# Patient Record
Sex: FEMALE | Race: WHITE | NOT HISPANIC OR LATINO | URBAN - METROPOLITAN AREA
[De-identification: names, ages, dates, MRNs, and addresses within clinical notes are randomized per-mention and may not be internally consistent; named-entity substitution may affect disease eponyms.]

---

## 2018-06-01 ENCOUNTER — OFFICE VISIT (OUTPATIENT)
Dept: SURGERY | Facility: CLINIC | Age: 48
End: 2018-06-01
Payer: COMMERCIAL

## 2018-06-01 VITALS
BODY MASS INDEX: 28.71 KG/M2 | HEIGHT: 62 IN | SYSTOLIC BLOOD PRESSURE: 126 MMHG | HEART RATE: 63 BPM | DIASTOLIC BLOOD PRESSURE: 76 MMHG | TEMPERATURE: 98.6 F | WEIGHT: 156 LBS

## 2018-06-01 DIAGNOSIS — L72.3 SEBACEOUS CYST: Primary | ICD-10-CM

## 2018-06-01 PROCEDURE — 99213 OFFICE O/P EST LOW 20 MIN: CPT | Performed by: SURGERY

## 2018-06-01 RX ORDER — DOXYCYCLINE 100 MG/1
100 CAPSULE ORAL
Refills: 0 | COMMUNITY
Start: 2018-05-22

## 2018-06-01 NOTE — LETTER
"June 9, 2018     Alma Ledesma RN    Patient: Corina Galicia   YOB: 1970   Date of Visit: 6/1/2018       Dear Dr. Ledesma:    Thank you for referring Corina Galicia to me for evaluation. Below are my notes for this consultation.    If you have questions, please do not hesitate to call me. I look forward to following your patient along with you.         Sincerely,        Gino Oropeza MD        CC: No Recipients  Gino Oropeza MD  6/9/2018 11:55 AM  Sign at close encounter    Subjective     Patient ID: Corina Galicia is a 47 y.o. female.    HPI   Patient known to me from previous excision of a sebaceous cyst of the right groin crease.  She is developed a new nodule at the lateral edge of her previous incision.  She has seen other surgeons as well as dermatologist in the past.  She also has a small cyst of her right buttock which is recently been drained by a dermatologist.    Review of Systems   All other systems reviewed and are negative.      Objective     Vitals:    06/01/18 1128   BP: 126/76   Pulse: 63   Temp: 37 °C (98.6 °F)   Weight: 70.8 kg (156 lb)   Height: 1.585 m (5' 2.4\")     Body mass index is 28.17 kg/m².    Physical Exam   Constitutional: She appears well-developed and well-nourished.   HENT:   Head: Normocephalic.   Neck: Normal range of motion.   Cardiovascular: Normal rate and regular rhythm.    Pulmonary/Chest: Effort normal and breath sounds normal.   Abdominal: Soft. Bowel sounds are normal.   Musculoskeletal: Normal range of motion.   Neurological: She is alert.   Skin: Skin is warm.   1 cm cyst in the right groin crease, with slight drainage.  Well-healed incision.  1 cm cyst of the right buttock that does not appear to be infected but there is a small opening due to previous procedure.   Psychiatric: She has a normal mood and affect. Her behavior is normal. Judgment and thought content normal.       Assessment/Plan   Problem List Items Addressed This Visit     Sebaceous " cyst - Primary        I had a discussion with the patient and her .  I am concerned that she may have hidradenitis in her right groin.  But there is a discrete nodule and this may be compatible with a new cyst.  At the present time I would like any inflammation to settle down and have asked her to keep the areas clean and dry as possible and return in 1 month's time for reevaluation and possibly scheduling of excision of the right groin cyst in the right buttock cyst.

## 2018-06-06 PROBLEM — L98.8 SEBACEOUS PAPULE: Chronic | Status: ACTIVE | Noted: 2018-06-06

## 2018-06-06 PROBLEM — L72.3 SEBACEOUS CYST: Status: ACTIVE | Noted: 2018-06-06

## 2018-06-09 NOTE — PROGRESS NOTES
"  Subjective     Patient ID: Corina Galicia is a 47 y.o. female.    HPI   Patient known to me from previous excision of a sebaceous cyst of the right groin crease.  She is developed a new nodule at the lateral edge of her previous incision.  She has seen other surgeons as well as dermatologist in the past.  She also has a small cyst of her right buttock which is recently been drained by a dermatologist.    Review of Systems   All other systems reviewed and are negative.      Objective     Vitals:    06/01/18 1128   BP: 126/76   Pulse: 63   Temp: 37 °C (98.6 °F)   Weight: 70.8 kg (156 lb)   Height: 1.585 m (5' 2.4\")     Body mass index is 28.17 kg/m².    Physical Exam   Constitutional: She appears well-developed and well-nourished.   HENT:   Head: Normocephalic.   Neck: Normal range of motion.   Cardiovascular: Normal rate and regular rhythm.    Pulmonary/Chest: Effort normal and breath sounds normal.   Abdominal: Soft. Bowel sounds are normal.   Musculoskeletal: Normal range of motion.   Neurological: She is alert.   Skin: Skin is warm.   1 cm cyst in the right groin crease, with slight drainage.  Well-healed incision.  1 cm cyst of the right buttock that does not appear to be infected but there is a small opening due to previous procedure.   Psychiatric: She has a normal mood and affect. Her behavior is normal. Judgment and thought content normal.       Assessment/Plan   Problem List Items Addressed This Visit     Sebaceous cyst - Primary        I had a discussion with the patient and her .  I am concerned that she may have hidradenitis in her right groin.  But there is a discrete nodule and this may be compatible with a new cyst.  At the present time I would like any inflammation to settle down and have asked her to keep the areas clean and dry as possible and return in 1 month's time for reevaluation and possibly scheduling of excision of the right groin cyst in the right buttock cyst.    "

## 2018-06-29 ENCOUNTER — OFFICE VISIT (OUTPATIENT)
Dept: SURGERY | Facility: CLINIC | Age: 48
End: 2018-06-29
Payer: COMMERCIAL

## 2018-06-29 VITALS
HEIGHT: 63 IN | HEART RATE: 76 BPM | BODY MASS INDEX: 27.64 KG/M2 | TEMPERATURE: 99 F | WEIGHT: 156 LBS | DIASTOLIC BLOOD PRESSURE: 80 MMHG | SYSTOLIC BLOOD PRESSURE: 122 MMHG

## 2018-06-29 DIAGNOSIS — L05.91 PILONIDAL CYST: Primary | ICD-10-CM

## 2018-06-29 PROCEDURE — 10060 I&D ABSCESS SIMPLE/SINGLE: CPT | Performed by: SURGERY

## 2018-06-29 PROCEDURE — 99213 OFFICE O/P EST LOW 20 MIN: CPT | Mod: 25 | Performed by: SURGERY

## 2018-06-29 NOTE — PATIENT INSTRUCTIONS
"Wound care after abscess drainage    1.  Your wound has been packed open with gauze that has been soaked with Betadine (brown).  If there is leakage through the bandages, either reinforce with more bandage or remove the outer bandages and replace them.  2.  In 24 hours remove all of the bandages, including the one packed under the skin.  There will likely be some oozing and bleeding.  This will stop on its own.  3.  It is okay to take a shower and get the wound wet, soap and water is fine.  4.  Twice a day have someone take a Q-tip (does not need to be sterile and gloves are not needed), soak it with hydrogen peroxide, and cleanse the wound deeply.  The Q-tip needs to go deep into the wound to clean out the debris and the old blood inside.  The peroxide will foam up (\"scrubbing bubbles\").  Repeat several times.  5.  After cleansing the wound place a clean dry dressing over the wound until next time, you do not need to repack it.  If there is not too much drainage you may use a Band-Aid.  6.  It is important to aggressively cleanse and keep the wound open.  It will try to close in between cleanings.  7.  You will return in about 1 week to check the wound, it should still be open.  It takes about 2 weeks for it to close, which it will do even with the Q-tip cleaning.    "

## 2018-06-29 NOTE — LETTER
June 29, 2018     Alma Ledesma RN    Patient: Corina Galicia   YOB: 1970   Date of Visit: 6/29/2018       Dear Dr. Ledesma:    Thank you for referring Corina Galicia to me for evaluation. Below are my notes for this consultation.    If you have questions, please do not hesitate to call me. I look forward to following your patient along with you.         Sincerely,        Gino Oropeza MD        CC: No Recipients  Gino Oropeza MD  6/29/2018 11:55 AM  Sign at close encounter  Previously seen with a cyst in the right groin which is at present quiescent.  She has had increasing symptoms with drainage from an abscess in the right pilonidal area.  We will proceed with drainage today.      Incision and drainage abscess, pilonidal    The area of the abscess was prepped in the standard fashion.  It was infiltrated with 1% lidocaine local anesthesia.  Incision and drainage was carried out with a #11 blade.  Purulent material was evacuated.  A culture was taken and sent to microbiology.  Standard postprocedure dressing followed. The patient tolerated the procedure well, and was instructed in wound care.    Follow-up in 1 week

## 2018-06-29 NOTE — PROGRESS NOTES
Previously seen with a cyst in the right groin which is at present quiescent.  She has had increasing symptoms with drainage from an abscess in the right pilonidal area.  We will proceed with drainage today.      Incision and drainage abscess, pilonidal    The area of the abscess was prepped in the standard fashion.  It was infiltrated with 1% lidocaine local anesthesia.  Incision and drainage was carried out with a #11 blade.  Purulent material was evacuated.  A culture was taken and sent to microbiology.  Standard postprocedure dressing followed. The patient tolerated the procedure well, and was instructed in wound care.    Follow-up in 1 week

## 2018-07-06 ENCOUNTER — OFFICE VISIT (OUTPATIENT)
Dept: SURGERY | Facility: CLINIC | Age: 48
End: 2018-07-06
Payer: COMMERCIAL

## 2018-07-06 VITALS
HEART RATE: 96 BPM | WEIGHT: 156 LBS | HEIGHT: 63 IN | SYSTOLIC BLOOD PRESSURE: 126 MMHG | BODY MASS INDEX: 27.64 KG/M2 | OXYGEN SATURATION: 98 % | DIASTOLIC BLOOD PRESSURE: 82 MMHG | TEMPERATURE: 98 F

## 2018-07-06 DIAGNOSIS — L72.3 SEBACEOUS CYST: Primary | ICD-10-CM

## 2018-07-06 DIAGNOSIS — L05.91 PILONIDAL CYST: ICD-10-CM

## 2018-07-06 PROCEDURE — 99024 POSTOP FOLLOW-UP VISIT: CPT | Performed by: SURGERY

## 2018-07-06 NOTE — LETTER
July 16, 2018     Alma Ledesma RN    Patient: Corina Galicia   YOB: 1970   Date of Visit: 7/6/2018       Dear Dr. Ledesma:    Thank you for referring Corina Galicia to me for evaluation. Below are my notes for this consultation.    If you have questions, please do not hesitate to call me. I look forward to following your patient along with you.         Sincerely,        Gino Oropeza MD        CC: No Recipients  Gino Oropeza MD  7/16/2018  2:10 PM  Sign at close encounter  Postop office visit:      The patient is post I&D of infected pilonidal cyst. They are making a nice recovery without complaint. They have resumed normal activities.    On examination their surgical site is healing normally. The incision is clean without evidence of ongoing infection.  The wound remains open.  Her right groin cyst is stable    The patient can resume unrestricted physical activities.  She will return in 1 month to discuss excision of the residual pilonidal cyst and sebaceous cyst of the right groin.    Return here as needed      Gino Oropeza MD    7/16/2018

## 2018-07-16 NOTE — PATIENT INSTRUCTIONS
Continue present wound care.  Follow-up in 1 month to discuss possible excision of sebaceous cyst and pilonidal cyst.

## 2018-07-16 NOTE — PROGRESS NOTES
Postop office visit:      The patient is post I&D of infected pilonidal cyst. They are making a nice recovery without complaint. They have resumed normal activities.    On examination their surgical site is healing normally. The incision is clean without evidence of ongoing infection.  The wound remains open.  Her right groin cyst is stable    The patient can resume unrestricted physical activities.  She will return in 1 month to discuss excision of the residual pilonidal cyst and sebaceous cyst of the right groin.    Return here as needed      Gino Oropeza MD    7/16/2018

## 2018-08-17 ENCOUNTER — OFFICE VISIT (OUTPATIENT)
Dept: SURGERY | Facility: CLINIC | Age: 48
End: 2018-08-17
Payer: COMMERCIAL

## 2018-08-17 VITALS — HEIGHT: 63 IN | WEIGHT: 156 LBS | BODY MASS INDEX: 27.64 KG/M2

## 2018-08-17 DIAGNOSIS — L05.91 PILONIDAL CYST: ICD-10-CM

## 2018-08-17 DIAGNOSIS — L72.3 SEBACEOUS CYST: Primary | ICD-10-CM

## 2018-08-17 PROCEDURE — 99213 OFFICE O/P EST LOW 20 MIN: CPT | Performed by: SURGERY

## 2018-08-17 NOTE — LETTER
"August 23, 2018     Alma Ledesma RN    Patient: Corina Galicia   YOB: 1970   Date of Visit: 8/17/2018       Dear Dr. Ledesma:    Thank you for referring Corina Galicia to me for evaluation. Below are my notes for this consultation.    If you have questions, please do not hesitate to call me. I look forward to following your patient along with you.         Sincerely,        Gino Oropeza MD        CC: No Recipients  Gino Oropeza MD  8/23/2018  2:34 PM  Sign at close encounter    Subjective     Patient ID: Corina Galicia is a 48 y.o. female.    HPI  I have followed this patient for a number of months following excision of a sebaceous cyst from her right groin.  She developed some recurrent drainage in that area and may have another sebaceous cyst which is quiescent at this time.  She also has intermittently had drainage from the pilonidal area and has had drainage procedures in the past.  At the present time this is healed over but there is a palpable lump consistent with a pilonidal cyst that does not appear to be abscessed at this time.  Review of Systems   All other systems reviewed and are negative.      Objective     Vitals:    08/17/18 1015   Weight: 70.8 kg (156 lb)   Height: 1.588 m (5' 2.5\")     Body mass index is 28.08 kg/m².    Physical Exam   Constitutional: She appears well-developed.   HENT:   Head: Normocephalic.   Cardiovascular: Normal rate.    Pulmonary/Chest: Effort normal.   Musculoskeletal: Normal range of motion.   Neurological: She is alert.   Psychiatric: She has a normal mood and affect. Her behavior is normal.     Lump in the pilonidal area which is mildly tender, without erythema ,the right groin is stable.  Assessment/Plan   Problem List Items Addressed This Visit     Sebaceous cyst - Primary    Pilonidal cyst      She has recurrent pilonidal inflammation and at this point I think excision is warranted.  We discussed the procedure and its will schedule this at her " convenience in the future.  She understands that she may have an open wound which require long-term care.  At the present time the right groin wound is quiescent and we will continue to observe this area.

## 2018-08-23 NOTE — PATIENT INSTRUCTIONS
She has recurrent pilonidal inflammation and at this point I think excision is warranted.  We discussed the procedure and its will schedule this at her convenience in the future.  She understands that she may have an open wound which require long-term care.  At the present time the right groin wound is quiescent and we will continue to observe this area.

## 2018-08-23 NOTE — PROGRESS NOTES
"  Subjective     Patient ID: Corina Galicia is a 48 y.o. female.    HPI  I have followed this patient for a number of months following excision of a sebaceous cyst from her right groin.  She developed some recurrent drainage in that area and may have another sebaceous cyst which is quiescent at this time.  She also has intermittently had drainage from the pilonidal area and has had drainage procedures in the past.  At the present time this is healed over but there is a palpable lump consistent with a pilonidal cyst that does not appear to be abscessed at this time.  Review of Systems   All other systems reviewed and are negative.      Objective     Vitals:    08/17/18 1015   Weight: 70.8 kg (156 lb)   Height: 1.588 m (5' 2.5\")     Body mass index is 28.08 kg/m².    Physical Exam   Constitutional: She appears well-developed.   HENT:   Head: Normocephalic.   Cardiovascular: Normal rate.    Pulmonary/Chest: Effort normal.   Musculoskeletal: Normal range of motion.   Neurological: She is alert.   Psychiatric: She has a normal mood and affect. Her behavior is normal.     Lump in the pilonidal area which is mildly tender, without erythema ,the right groin is stable.  Assessment/Plan   Problem List Items Addressed This Visit     Sebaceous cyst - Primary    Pilonidal cyst      She has recurrent pilonidal inflammation and at this point I think excision is warranted.  We discussed the procedure and its will schedule this at her convenience in the future.  She understands that she may have an open wound which require long-term care.  At the present time the right groin wound is quiescent and we will continue to observe this area.      "

## 2018-09-15 LAB
BASOPHILS # BLD AUTO: 0 X10E3/UL (ref 0–0.2)
BASOPHILS NFR BLD AUTO: 0 %
BUN SERPL-MCNC: 7 MG/DL (ref 6–24)
BUN/CREAT SERPL: 12 (ref 9–23)
CALCIUM SERPL-MCNC: 9.1 MG/DL (ref 8.7–10.2)
CHLORIDE SERPL-SCNC: 100 MMOL/L (ref 96–106)
CO2 SERPL-SCNC: 22 MMOL/L (ref 20–29)
CREAT SERPL-MCNC: 0.57 MG/DL (ref 0.57–1)
EOSINOPHIL # BLD AUTO: 0.3 X10E3/UL (ref 0–0.4)
EOSINOPHIL NFR BLD AUTO: 3 %
ERYTHROCYTE [DISTWIDTH] IN BLOOD BY AUTOMATED COUNT: 16.2 % (ref 12.3–15.4)
GLUCOSE SERPL-MCNC: 195 MG/DL (ref 65–99)
HCT VFR BLD AUTO: 37.1 % (ref 34–46.6)
HGB BLD-MCNC: 11.7 G/DL (ref 11.1–15.9)
IMM GRANULOCYTES # BLD: 0 X10E3/UL (ref 0–0.1)
IMM GRANULOCYTES NFR BLD: 0 %
LAB CORP EGFR IF AFRICN AM: 129 ML/MIN/1.73
LAB CORP EGFR IF NONAFRICN AM: 112 ML/MIN/1.73
LYMPHOCYTES # BLD AUTO: 3.2 X10E3/UL (ref 0.7–3.1)
LYMPHOCYTES NFR BLD AUTO: 35 %
MCH RBC QN AUTO: 26.1 PG (ref 26.6–33)
MCHC RBC AUTO-ENTMCNC: 31.5 G/DL (ref 31.5–35.7)
MCV RBC AUTO: 83 FL (ref 79–97)
MONOCYTES # BLD AUTO: 0.7 X10E3/UL (ref 0.1–0.9)
MONOCYTES NFR BLD AUTO: 7 %
NEUTROPHILS # BLD AUTO: 5 X10E3/UL (ref 1.4–7)
NEUTROPHILS NFR BLD AUTO: 55 %
PLATELET # BLD AUTO: 432 X10E3/UL (ref 150–379)
POTASSIUM SERPL-SCNC: 5 MMOL/L (ref 3.5–5.2)
RBC # BLD AUTO: 4.49 X10E6/UL (ref 3.77–5.28)
SODIUM SERPL-SCNC: 137 MMOL/L (ref 134–144)
WBC # BLD AUTO: 9.2 X10E3/UL (ref 3.4–10.8)

## 2018-09-20 ENCOUNTER — ANESTHESIA (OUTPATIENT)
Dept: OPERATING ROOM | Facility: HOSPITAL | Age: 48
Setting detail: HOSPITAL OUTPATIENT SURGERY
End: 2018-09-20
Payer: COMMERCIAL

## 2018-09-20 ENCOUNTER — HOSPITAL ENCOUNTER (OUTPATIENT)
Facility: HOSPITAL | Age: 48
Discharge: HOME | End: 2018-09-20
Attending: SURGERY | Admitting: SURGERY
Payer: COMMERCIAL

## 2018-09-20 VITALS
OXYGEN SATURATION: 96 % | TEMPERATURE: 98.2 F | SYSTOLIC BLOOD PRESSURE: 107 MMHG | DIASTOLIC BLOOD PRESSURE: 63 MMHG | RESPIRATION RATE: 18 BRPM | HEART RATE: 97 BPM

## 2018-09-20 DIAGNOSIS — L72.3 SEBACEOUS CYST: ICD-10-CM

## 2018-09-20 LAB
B-HCG UR QL: NEGATIVE
POCT TEST: NORMAL

## 2018-09-20 PROCEDURE — 63600000 HC DRUGS/DETAIL CODE: Performed by: ANESTHESIOLOGY

## 2018-09-20 PROCEDURE — 25000000 HC PHARMACY GENERAL: Performed by: NURSE ANESTHETIST, CERTIFIED REGISTERED

## 2018-09-20 PROCEDURE — 71000001 HC PACU PHASE 1 INITIAL 30MIN: Performed by: SURGERY

## 2018-09-20 PROCEDURE — 36000003 HC OR LEVEL 3 INITIAL 30MIN: Performed by: SURGERY

## 2018-09-20 PROCEDURE — 63700000 HC SELF-ADMINISTRABLE DRUG: Performed by: STUDENT IN AN ORGANIZED HEALTH CARE EDUCATION/TRAINING PROGRAM

## 2018-09-20 PROCEDURE — 37000001 HC ANESTHESIA GENERAL: Performed by: SURGERY

## 2018-09-20 PROCEDURE — 36000013 HC OR LEVEL 3 EA ADDL MIN: Performed by: SURGERY

## 2018-09-20 PROCEDURE — 71000011 HC PACU PHASE 1 EA ADDL MIN: Performed by: SURGERY

## 2018-09-20 PROCEDURE — 63600000 HC DRUGS/DETAIL CODE: Performed by: SURGERY

## 2018-09-20 PROCEDURE — 63600000 HC DRUGS/DETAIL CODE: Performed by: NURSE ANESTHETIST, CERTIFIED REGISTERED

## 2018-09-20 PROCEDURE — 25000000 HC PHARMACY GENERAL: Performed by: SURGERY

## 2018-09-20 PROCEDURE — 88304 TISSUE EXAM BY PATHOLOGIST: CPT | Performed by: SURGERY

## 2018-09-20 PROCEDURE — 0JB90ZZ EXCISION OF BUTTOCK SUBCUTANEOUS TISSUE AND FASCIA, OPEN APPROACH: ICD-10-PCS | Performed by: SURGERY

## 2018-09-20 PROCEDURE — 11771 EXC PILONIDAL CYST XTNSV: CPT | Performed by: SURGERY

## 2018-09-20 PROCEDURE — 25800000 HC PHARMACY IV SOLUTIONS: Performed by: NURSE ANESTHETIST, CERTIFIED REGISTERED

## 2018-09-20 PROCEDURE — 200200 PR NO CHARGE: Performed by: SURGERY

## 2018-09-20 RX ORDER — DEXAMETHASONE SODIUM PHOSPHATE 4 MG/ML
INJECTION, SOLUTION INTRA-ARTICULAR; INTRALESIONAL; INTRAMUSCULAR; INTRAVENOUS; SOFT TISSUE AS NEEDED
Status: DISCONTINUED | OUTPATIENT
Start: 2018-09-20 | End: 2018-09-20 | Stop reason: SURG

## 2018-09-20 RX ORDER — OXYCODONE HYDROCHLORIDE 5 MG/1
5 TABLET ORAL EVERY 4 HOURS PRN
Qty: 14 TABLET | Refills: 0 | Status: SHIPPED | OUTPATIENT
Start: 2018-09-20 | End: 2018-09-25

## 2018-09-20 RX ORDER — MIDAZOLAM HYDROCHLORIDE 2 MG/2ML
INJECTION, SOLUTION INTRAMUSCULAR; INTRAVENOUS AS NEEDED
Status: DISCONTINUED | OUTPATIENT
Start: 2018-09-20 | End: 2018-09-20 | Stop reason: SURG

## 2018-09-20 RX ORDER — FENTANYL CITRATE 50 UG/ML
INJECTION, SOLUTION INTRAMUSCULAR; INTRAVENOUS AS NEEDED
Status: DISCONTINUED | OUTPATIENT
Start: 2018-09-20 | End: 2018-09-20 | Stop reason: SURG

## 2018-09-20 RX ORDER — BUPIVACAINE HYDROCHLORIDE 2.5 MG/ML
INJECTION, SOLUTION EPIDURAL; INFILTRATION; INTRACAUDAL AS NEEDED
Status: DISCONTINUED | OUTPATIENT
Start: 2018-09-20 | End: 2018-09-20 | Stop reason: HOSPADM

## 2018-09-20 RX ORDER — ONDANSETRON HYDROCHLORIDE 2 MG/ML
4 INJECTION, SOLUTION INTRAVENOUS EVERY 8 HOURS PRN
Status: DISCONTINUED | OUTPATIENT
Start: 2018-09-20 | End: 2018-09-20 | Stop reason: HOSPADM

## 2018-09-20 RX ORDER — FENTANYL CITRATE 50 UG/ML
50 INJECTION, SOLUTION INTRAMUSCULAR; INTRAVENOUS
Status: DISCONTINUED | OUTPATIENT
Start: 2018-09-20 | End: 2018-09-20 | Stop reason: HOSPADM

## 2018-09-20 RX ORDER — ONDANSETRON HYDROCHLORIDE 2 MG/ML
4 INJECTION, SOLUTION INTRAVENOUS
Status: DISCONTINUED | OUTPATIENT
Start: 2018-09-20 | End: 2018-09-20 | Stop reason: HOSPADM

## 2018-09-20 RX ORDER — CEFAZOLIN SODIUM 2 G/50ML
SOLUTION INTRAVENOUS
Status: COMPLETED
Start: 2018-09-20 | End: 2018-09-20

## 2018-09-20 RX ORDER — LABETALOL HYDROCHLORIDE 5 MG/ML
5 INJECTION, SOLUTION INTRAVENOUS AS NEEDED
Status: DISCONTINUED | OUTPATIENT
Start: 2018-09-20 | End: 2018-09-20 | Stop reason: HOSPADM

## 2018-09-20 RX ORDER — ROCURONIUM BROMIDE 10 MG/ML
INJECTION, SOLUTION INTRAVENOUS AS NEEDED
Status: DISCONTINUED | OUTPATIENT
Start: 2018-09-20 | End: 2018-09-20 | Stop reason: SURG

## 2018-09-20 RX ORDER — IBUPROFEN 400 MG/1
400 TABLET ORAL EVERY 6 HOURS PRN
Status: DISCONTINUED | OUTPATIENT
Start: 2018-09-20 | End: 2018-09-20 | Stop reason: HOSPADM

## 2018-09-20 RX ORDER — KETOROLAC TROMETHAMINE 30 MG/ML
INJECTION, SOLUTION INTRAMUSCULAR; INTRAVENOUS AS NEEDED
Status: DISCONTINUED | OUTPATIENT
Start: 2018-09-20 | End: 2018-09-20 | Stop reason: SURG

## 2018-09-20 RX ORDER — PROPOFOL 10 MG/ML
INJECTION, EMULSION INTRAVENOUS AS NEEDED
Status: DISCONTINUED | OUTPATIENT
Start: 2018-09-20 | End: 2018-09-20 | Stop reason: SURG

## 2018-09-20 RX ORDER — ACETAMINOPHEN 325 MG/1
650 TABLET ORAL EVERY 4 HOURS PRN
Status: DISCONTINUED | OUTPATIENT
Start: 2018-09-20 | End: 2018-09-20 | Stop reason: HOSPADM

## 2018-09-20 RX ORDER — LIDOCAINE HYDROCHLORIDE 10 MG/ML
INJECTION, SOLUTION INFILTRATION; PERINEURAL AS NEEDED
Status: DISCONTINUED | OUTPATIENT
Start: 2018-09-20 | End: 2018-09-20 | Stop reason: SURG

## 2018-09-20 RX ORDER — LIDOCAINE HYDROCHLORIDE 10 MG/ML
INJECTION, SOLUTION INFILTRATION; PERINEURAL AS NEEDED
Status: DISCONTINUED | OUTPATIENT
Start: 2018-09-20 | End: 2018-09-20 | Stop reason: HOSPADM

## 2018-09-20 RX ORDER — OXYCODONE HYDROCHLORIDE 5 MG/1
5 TABLET ORAL EVERY 4 HOURS PRN
Status: DISCONTINUED | OUTPATIENT
Start: 2018-09-20 | End: 2018-09-20 | Stop reason: HOSPADM

## 2018-09-20 RX ORDER — IBUPROFEN 200 MG
16-32 TABLET ORAL AS NEEDED
Status: DISCONTINUED | OUTPATIENT
Start: 2018-09-20 | End: 2018-09-20 | Stop reason: HOSPADM

## 2018-09-20 RX ORDER — DEXTROSE 40 %
15-30 GEL (GRAM) ORAL AS NEEDED
Status: DISCONTINUED | OUTPATIENT
Start: 2018-09-20 | End: 2018-09-20 | Stop reason: HOSPADM

## 2018-09-20 RX ORDER — PHENYLEPHRINE HYDROCHLORIDE 10 MG/ML
INJECTION INTRAVENOUS AS NEEDED
Status: DISCONTINUED | OUTPATIENT
Start: 2018-09-20 | End: 2018-09-20 | Stop reason: SURG

## 2018-09-20 RX ORDER — ONDANSETRON HYDROCHLORIDE 2 MG/ML
INJECTION, SOLUTION INTRAVENOUS AS NEEDED
Status: DISCONTINUED | OUTPATIENT
Start: 2018-09-20 | End: 2018-09-20 | Stop reason: SURG

## 2018-09-20 RX ORDER — DEXTROSE 50 % IN WATER (D50W) INTRAVENOUS SYRINGE
25 AS NEEDED
Status: DISCONTINUED | OUTPATIENT
Start: 2018-09-20 | End: 2018-09-20 | Stop reason: HOSPADM

## 2018-09-20 RX ORDER — SODIUM CHLORIDE 9 MG/ML
INJECTION, SOLUTION INTRAVENOUS CONTINUOUS PRN
Status: DISCONTINUED | OUTPATIENT
Start: 2018-09-20 | End: 2018-09-20 | Stop reason: SURG

## 2018-09-20 RX ORDER — HYDROMORPHONE HYDROCHLORIDE 1 MG/ML
0.5 INJECTION, SOLUTION INTRAMUSCULAR; INTRAVENOUS; SUBCUTANEOUS
Status: DISCONTINUED | OUTPATIENT
Start: 2018-09-20 | End: 2018-09-20 | Stop reason: HOSPADM

## 2018-09-20 RX ORDER — CEFAZOLIN SODIUM 2 G/50ML
2 SOLUTION INTRAVENOUS
Status: COMPLETED | OUTPATIENT
Start: 2018-09-20 | End: 2018-09-20

## 2018-09-20 RX ADMIN — PHENYLEPHRINE HYDROCHLORIDE 50 MCG: 10 INJECTION INTRAVENOUS at 11:27

## 2018-09-20 RX ADMIN — ROCURONIUM BROMIDE 40 MG: 10 INJECTION, SOLUTION INTRAVENOUS at 11:11

## 2018-09-20 RX ADMIN — DEXAMETHASONE SODIUM PHOSPHATE 8 MG: 4 INJECTION, SOLUTION INTRAMUSCULAR; INTRAVENOUS at 11:33

## 2018-09-20 RX ADMIN — MIDAZOLAM HYDROCHLORIDE 2 MG: 1 INJECTION, SOLUTION INTRAMUSCULAR; INTRAVENOUS at 11:01

## 2018-09-20 RX ADMIN — LIDOCAINE HYDROCHLORIDE 5 ML: 10 INJECTION, SOLUTION INFILTRATION; PERINEURAL at 11:11

## 2018-09-20 RX ADMIN — FENTANYL CITRATE 100 MCG: 50 INJECTION INTRAMUSCULAR; INTRAVENOUS at 11:11

## 2018-09-20 RX ADMIN — PROPOFOL 200 MG: 10 INJECTION, EMULSION INTRAVENOUS at 11:11

## 2018-09-20 RX ADMIN — ONDANSETRON 4 MG: 2 INJECTION INTRAMUSCULAR; INTRAVENOUS at 11:33

## 2018-09-20 RX ADMIN — PHENYLEPHRINE HYDROCHLORIDE 50 MCG: 10 INJECTION INTRAVENOUS at 11:31

## 2018-09-20 RX ADMIN — PHENYLEPHRINE HYDROCHLORIDE 50 MCG: 10 INJECTION INTRAVENOUS at 11:20

## 2018-09-20 RX ADMIN — FENTANYL CITRATE 50 MCG: 50 INJECTION INTRAMUSCULAR; INTRAVENOUS at 13:17

## 2018-09-20 RX ADMIN — CEFAZOLIN SODIUM 2 G: 2 SOLUTION INTRAVENOUS at 11:05

## 2018-09-20 RX ADMIN — SUGAMMADEX 300 MG: 100 INJECTION, SOLUTION INTRAVENOUS at 11:44

## 2018-09-20 RX ADMIN — OXYCODONE HYDROCHLORIDE 5 MG: 5 TABLET ORAL at 14:28

## 2018-09-20 RX ADMIN — SODIUM CHLORIDE: 900 INJECTION, SOLUTION INTRAVENOUS at 11:04

## 2018-09-20 RX ADMIN — ACETAMINOPHEN 650 MG: 325 TABLET, FILM COATED ORAL at 14:28

## 2018-09-20 RX ADMIN — KETOROLAC TROMETHAMINE 30 MG: 30 INJECTION, SOLUTION INTRAMUSCULAR at 11:51

## 2018-09-20 NOTE — H&P
General Surgery History and Physical           Medical History:   Past Medical History:   Diagnosis Date   • Anemia     heavy period        Surgical History:   Past Surgical History:   Procedure Laterality Date   •  SECTION      x1    • CYST REMOVAL Right 2017    Sebaceous cyst of the R upper inner thigh   • CYST REMOVAL  2018       Social History:   Social History     Social History Narrative   • No narrative on file       Family History: History reviewed. No pertinent family history.    Allergies: Amoxicillin and Jovany perox-hc, cleanser no.14    Home Medications:  •  doxycycline, Take 100 mg by mouth once daily. with food    Current Medications:  •  ceFAZolin in dextrose (iso-os), , ,   •  ceFAZolin, 2 g, intravenous, 60 min Pre-Op    Review of Systems  Pertinent items are noted in HPI.    Objective     Vital Signs for the last 24 hours:  Temp:  [36.4 °C (97.6 °F)] 36.4 °C (97.6 °F)  Heart Rate:  [81] 81  Resp:  [17] 17  BP: (120)/(72) 120/72    Physicial Exam          Labs      Imaging      Assessment/Plan     Code Status: Full Code  · Gino Oropeza MD   General Surgery  · Sebaceous cyst +1 more   Dx  · Post-op   Reason for Visit    Progress Notes   Expand All Collapse All         Subjective         Patient ID: Corina Galicia is a 48 y.o. female.     HPI  I have followed this patient for a number of months following excision of a sebaceous cyst from her right groin.  She developed some recurrent drainage in that area and may have another sebaceous cyst which is quiescent at this time.  She also has intermittently had drainage from the pilonidal area and has had drainage procedures in the past.  At the present time this is healed over but there is a palpable lump consistent with a pilonidal cyst that does not appear to be abscessed at this time.  Review of Systems   All other systems reviewed and are negative.           Objective         Vitals       Vitals:     18 1015   Weight: 70.8  "kg (156 lb)   Height: 1.588 m (5' 2.5\")         Body mass index is 28.08 kg/m².     Physical Exam   Constitutional: She appears well-developed.   HENT:   Head: Normocephalic.   Cardiovascular: Normal rate.    Pulmonary/Chest: Effort normal.   Musculoskeletal: Normal range of motion.   Neurological: She is alert.   Psychiatric: She has a normal mood and affect. Her behavior is normal.      Lump in the pilonidal area which is mildly tender, without erythema ,the right groin is stable.     Assessment/Plan          Problem List Items Addressed This Visit      Sebaceous cyst - Primary     Pilonidal cyst       She has recurrent pilonidal inflammation and at this point I think excision is warranted.  We discussed the procedure and its will schedule this at her convenience in the future.  She understands that she may have an open wound which require long-term care.  At the present time the right groin wound is quiescent and we will continue to observe this area.               Instructions   She has recurrent pilonidal inflammation and at this point I think excision is warranted.  We discussed the procedure and its will schedule this at her convenience in the future.  She understands that she may have an open wound which require long-term care.  At the present time the right groin wound is quiescent and we will continue to observe this area.        · After Visit Summary (Printed 8/17/2018)   Additional Documentation   Vitals:   · Ht 1.588 m (5' 2.5\")   · Wt 70.8 kg (156 lb)   · BMI 28.08 kg/m²   · BSA 1.77 m²    Flowsheets:  · [facility] Custom Formula Data        "

## 2018-09-20 NOTE — OP NOTE
Date of surgery: 9/20/2018    Preop diagnosis: Infected pilonidal cyst  Postop diagnosis: Same  Procedure: Excision of pilonidal cyst  Surgeon: Johnna  Assistant: PGY 2  Anesthesia: General ET and local  Specimens: Pilonidal cyst  EBL: Minimal  Complications: None    Indications: 48-year-old female-year-old patient who presented with a long-standing soft tissue lump of the pilonidal area with intermittent drainage.  She states that has not recently had any drainage but continues to be sore.  After discussion with the patient they elected to proceed with excision.    Procedure: The patient was brought into the operating room and placed in a supine position and they were given general anesthesia and intubated.  She was then placed prone on the operating table.  Local consisting of 1% lidocaine mixed half-and-half with quarter percent Marcaine was also used throughout the procedure.  The skin overlying the cyst was clipped prepped and draped in the usual sterile fashion.  Unfortunately there is purulent drainage from the cyst as well as a midline pit close by we therefore decided to excise the cyst back to normal tissue but not attempt any type of closure.  An elliptical incision was made and carried down through the skin and subcutaneous tissue with the scalpel.  The incision incorporated the draining area which was to the right of the midline superiorly as well as midline pits more inferiorly.  Bleeding was controlled with coagulation Bovie.  Bovie was then used to excise the cyst in its entirety to normal subcutaneous tissue circumferentially, the cyst was then pulled into the wound and the posterior dissection completed and the specimen removed.  The wound was irrigated and local was instilled and bleeding was controlled with the Bovie.  The wound was then packed open with Betadine soaked gauze.  Sterile dressings were placed.  Sponge needle and estimate counts were correct ×2.  The patient tolerated the procedure  well and was taken to the recovery room in stable condition.

## 2018-09-20 NOTE — ANESTHESIA PREPROCEDURE EVALUATION
Anesthesia ROS/MED HX    Anesthesia History - neg  Pulmonary - neg  Neuro/Psych - neg  Cardiovascular- neg  GI/Hepatic- neg  Musculoskeletal- neg  Endo/Other- neg      Past Surgical History:   Procedure Laterality Date   •  SECTION      x1    • CYST REMOVAL Right 2017    Sebaceous cyst of the R upper inner thigh   • CYST REMOVAL  2018     CBC Results       18                          1337           WBC 9.2           RBC 4.49           HGB 11.7           HCT 37.1           MCV 83           MCH 26.1 (L)           MCHC 31.5            (H)                           Physical Exam    Airway   Mallampati: III   TM distance: >3 FB   Neck ROM: full  Cardiovascular - normal   Rhythm: regular   Rate: normal  Pulmonary - normal   clear to auscultation        Anesthesia Plan    Plan: general    Technique: general endotracheal   ASA 1  Anesthetic plan and risks discussed with: patient  Induction:    intravenous

## 2018-09-20 NOTE — DISCHARGE INSTRUCTIONS
RECOVERY AFTER YOUR PILONIDAL EXCISION    Pilonidal excisions are painful operations.  The pain is worst for the first 1 week after surgery, and usually disappears completely by 2 to 3 weeks after surgery.  After your operation, do not wait for the pain to be severe before taking your medications.    AFTER DISCHARGE - see me in two weeks !!!    Pain Control  · You will be given a prescription for Ibuprofen.  This will provide the mainstay of pain control after your operation.    The Wound  · Most pilonidal wounds are left open.  Some are closed with sutures.  Sometimes the open wounds are stabilized with sutures.  Some bleeding or discharge from the wound is normal for the first week or two after the operation.  If you have profuse, continuous bleeding, call your doctor immediately.  · Keep the wound very clean.  Take a sitz bath or tub bath (or showers with a hand-held sprayer) 3 to 4 times per day and then cover the wound with dry gauze.  Baths should last 10-15 minutes and should just be in warm (not hot) water.  Change the gauze if the dressings become soaked with discharge or blood.  · If sutures are used to stabilize your wound, THEY WILL UNRAVEL AFTER A WEEK OR TWO.  You may find “strings” hanging from your wound and it is perfectly safe to cut these off with ordinary scissors.   · You will be seeing your surgeon every two weeks for a wound check.    Activity  · Upon discharge, there are generally no restrictions on walking, climbing stairs or riding in a car.  There is very little you can do to damage the wound, but more activity will usually cause more pain or bleeding.  The best idea is to take it easy for about a week.    Causes for Concern  If any of the following occur, please call our office and we will arrange for a doctor to call you back  · Increasing pain, swelling, redness, or drainage from the incision  · Fever greater than 101 degrees / chills  · Severe bleeding  · Nausea or vomiting    Call   Johnna’s office [phone] to schedule an appointment for Friday. If problems arise after discharge, including but not limited to fever, vomiting or increasing redness   and/or discharge from the incision, call Dr. Oropeza’s office immediately.

## 2018-09-20 NOTE — ANESTHESIA PROCEDURE NOTES
Airway  Urgency: elective    Start Time: 9/20/2018 11:13 AM  Airway not difficult    General Information and Staff    Patient location during procedure: OR  Anesthesiologist: GUSTAVO GALAVIZ  Resident/CRNA: JUANA FRENCH  Performed: resident/CRNA     Indications and Patient Condition  Indications for airway management: anesthesia  Sedation level: deep  Preoxygenated: yes  Patient position: sniffing  Mask difficulty assessment: 1 - vent by mask    Final Airway Details  Final airway type: endotracheal airway      Successful airway: ETT  Cuffed: yes   Successful intubation technique: direct laryngoscopy  Facilitating devices/methods: intubating stylet  Endotracheal tube insertion site: oral  Blade: Antonella  Blade size: #3  ETT size: 7.0 mm  Cormack-Lehane Classification: grade I - full view of glottis  Placement verified by: chest auscultation and capnometry   Measured from: lips  ETT to lips (cm): 22  Number of attempts at approach: 1  Number of other approaches attempted: 0  Atraumatic airway insertion

## 2018-09-21 ENCOUNTER — TELEPHONE (OUTPATIENT)
Dept: SURGERY | Facility: CLINIC | Age: 48
End: 2018-09-21

## 2018-09-21 LAB
CASE RPRT: NORMAL
CLINICAL INFO: NORMAL
PATH REPORT.FINAL DX SPEC: NORMAL
PATH REPORT.GROSS SPEC: NORMAL

## 2018-09-21 NOTE — TELEPHONE ENCOUNTER
Called pt to inform them that [facility] will contact them to set up an appt.  ([facility] called 9/21/18 @2:00pm)

## 2018-09-24 ENCOUNTER — TELEPHONE (OUTPATIENT)
Dept: SURGERY | Facility: CLINIC | Age: 48
End: 2018-09-24

## 2018-09-27 ENCOUNTER — OFFICE VISIT (OUTPATIENT)
Dept: SURGERY | Facility: CLINIC | Age: 48
End: 2018-09-27
Payer: COMMERCIAL

## 2018-09-27 VITALS
HEIGHT: 62 IN | SYSTOLIC BLOOD PRESSURE: 108 MMHG | HEART RATE: 76 BPM | DIASTOLIC BLOOD PRESSURE: 76 MMHG | WEIGHT: 160 LBS | TEMPERATURE: 98.6 F | BODY MASS INDEX: 29.44 KG/M2

## 2018-09-27 DIAGNOSIS — L05.91 PILONIDAL CYST: Primary | ICD-10-CM

## 2018-09-27 PROCEDURE — 99024 POSTOP FOLLOW-UP VISIT: CPT | Performed by: SURGERY

## 2018-09-27 NOTE — LETTER
2018     Alma Ledesma RN    Patient: Corina Galicia   YOB: 1970   Date of Visit: 2018       Dear Dr. Ledesma:    Thank you for referring Corina Galicia to me for evaluation. Below are my notes for this consultation.    If you have questions, please do not hesitate to call me. I look forward to following your patient along with you.         Sincerely,        David Denis MD        CC: No Recipients  David Denis MD  2018  5:09 PM  Sign at close encounter      Chief Complaint:   Chief Complaint   Patient presents with   • Post-op     Sebaceous cyst   .    HPI     Patient is a 48 y.o. female who presents with the following:  Pt is one week out from excision of pilonidal cyst.   She has home nursing care - packing happens once per day - there is a fair amount of pain and drainage -     Medical History:   Past Medical History:   Diagnosis Date   • Anemia     heavy period        Surgical History:   Past Surgical History:   Procedure Laterality Date   •  SECTION      x1    • CYST REMOVAL Right 2017    Sebaceous cyst of the R upper inner thigh   • CYST REMOVAL  2018       Social History:   Social History     Social History   • Marital status:      Spouse name: N/A   • Number of children: N/A   • Years of education: N/A     Occupational History   • Not on file.     Social History Main Topics   • Smoking status: Never Smoker   • Smokeless tobacco: Never Used   • Alcohol use No   • Drug use: No   • Sexual activity: Not on file     Other Topics Concern   • Not on file     Social History Narrative   • No narrative on file       Family History:   History reviewed. No pertinent family history.    Allergies:   Amoxicillin and Jovany perox-hc, cleanser no.14    Current Medications:      Current Outpatient Prescriptions:   •  doxycycline (VIBRAMYCIN) 100 mg capsule, Take 100 mg by mouth once daily. with food, Disp: , Rfl: 0    Review of Systems  All 14  systems reviewed and the findings are not pertinent to the current problem.    Objective     Vital Signs  Vitals:    09/27/18 1654   BP: 108/76   Pulse: 76   Temp: 37 °C (98.6 °F)     Body mass index is 29.26 kg/m².      Physicial Exam    Postop anorectal exam:  The patients wounds are healing normally. No infection or complication is present. Specifics: large healthy 2x3 cm deep wound with almost 100% granulation tissue.         Problem List Items Addressed This Visit     Pilonidal cyst - Primary     She is one week out from excision of a pilonidal cyst.  Her wound is healing well and she has home care help.  I changed the dressing and she will see Dr. Oropeza in 2 weeks.                     David Denis MD 9/27/2018 5:09 PM

## 2018-09-27 NOTE — ASSESSMENT & PLAN NOTE
She is one week out from excision of a pilonidal cyst.  Her wound is healing well and she has home care help.  I changed the dressing and she will see Dr. Oropeza in 2 weeks.

## 2018-10-12 ENCOUNTER — OFFICE VISIT (OUTPATIENT)
Dept: SURGERY | Facility: CLINIC | Age: 48
End: 2018-10-12
Payer: COMMERCIAL

## 2018-10-12 VITALS
DIASTOLIC BLOOD PRESSURE: 79 MMHG | BODY MASS INDEX: 29.44 KG/M2 | HEIGHT: 62 IN | WEIGHT: 160 LBS | SYSTOLIC BLOOD PRESSURE: 120 MMHG

## 2018-10-12 DIAGNOSIS — L05.91 PILONIDAL CYST: Primary | ICD-10-CM

## 2018-10-12 PROCEDURE — 99024 POSTOP FOLLOW-UP VISIT: CPT | Performed by: SURGERY

## 2018-10-12 NOTE — LETTER
October 18, 2018     Alma Ledesma RN    Patient: Corina Galicia   YOB: 1970   Date of Visit: 10/12/2018       Dear Dr. Ledesma:    Thank you for referring Corina Galicia to me for evaluation. Below are my notes for this consultation.    If you have questions, please do not hesitate to call me. I look forward to following your patient along with you.         Sincerely,        Gino Oropeza MD        CC: No Recipients

## 2018-10-15 ENCOUNTER — TELEPHONE (OUTPATIENT)
Dept: SURGERY | Facility: CLINIC | Age: 48
End: 2018-10-15

## 2018-10-18 NOTE — PATIENT INSTRUCTIONS
The patient is post pilonidal cystectomy.  Final pathology was consistent with a pilonidal cyst.  They are making a nice recovery.  They can resume unrestricted physical activities as they feel up to it.  If they are not symptom-free in 6-8 weeks they will get back to me or return for reevaluation.  Otherwise return as needed.

## 2018-10-18 NOTE — PROGRESS NOTES
Postop office visit:      The patient is post pilonidal cystectomy with an open wound. They are making a nice recovery without complaint. They have resumed normal activities.    On examination their surgical site is healing normally. The incision is clean without evidence of infection.  Remains open but is granulating nicely.    The patient can resume unrestricted physical activities.    Return here as needed.  We will continue with visiting nurses for wound care until fully healed.      Gino Oropeza MD    10/18/2018

## 2018-10-30 ENCOUNTER — TELEPHONE (OUTPATIENT)
Dept: SURGERY | Facility: CLINIC | Age: 48
End: 2018-10-30

## 2018-10-30 ENCOUNTER — DOCUMENTATION (OUTPATIENT)
Dept: SURGERY | Facility: CLINIC | Age: 48
End: 2018-10-30

## 2018-10-30 NOTE — PROGRESS NOTES
Spoke with [facility] nurse, as of today she has discharged pt from home health services. States pt is doing well and 99% healed.

## 2018-12-07 ENCOUNTER — OFFICE VISIT (OUTPATIENT)
Dept: SURGERY | Facility: CLINIC | Age: 48
End: 2018-12-07
Payer: COMMERCIAL

## 2018-12-07 VITALS
SYSTOLIC BLOOD PRESSURE: 140 MMHG | HEIGHT: 62 IN | TEMPERATURE: 98.3 F | DIASTOLIC BLOOD PRESSURE: 90 MMHG | BODY MASS INDEX: 28.52 KG/M2 | WEIGHT: 155 LBS

## 2018-12-07 DIAGNOSIS — L05.91 PILONIDAL CYST: ICD-10-CM

## 2018-12-07 DIAGNOSIS — L72.3 SEBACEOUS CYST: Primary | ICD-10-CM

## 2018-12-07 PROCEDURE — 99024 POSTOP FOLLOW-UP VISIT: CPT | Performed by: SURGERY

## 2018-12-07 NOTE — LETTER
December 12, 2018     Alma Ledesma RN    Patient: Corina Galicia   YOB: 1970   Date of Visit: 12/7/2018       Dear Dr. Ledesma:    Thank you for referring Corina Galicia to me for evaluation. Below are my notes for this consultation.    If you have questions, please do not hesitate to call me. I look forward to following your patient along with you.         Sincerely,        Gino Oropeza MD        CC: No Recipients  Gino Oropeza MD  12/12/2018 11:44 AM  Sign at close encounter  Postop office visit:      The patient is post excision of pilonidal cyst. They are making a nice recovery without complaint, except for occasional pain and some fullness in the area. They have resumed normal activities.    On examination their surgical site is healing normally. The incision is clean without evidence of infection.  There is an expected amount of healing induration.    The patient can resume unrestricted physical activities.    Return here as needed.      Gino Oropeza MD    12/12/2018

## 2018-12-12 NOTE — PROGRESS NOTES
Postop office visit:      The patient is post excision of pilonidal cyst. They are making a nice recovery without complaint, except for occasional pain and some fullness in the area. They have resumed normal activities.    On examination their surgical site is healing normally. The incision is clean without evidence of infection.  There is an expected amount of healing induration.    The patient can resume unrestricted physical activities.    Return here as needed.      Gino Oropeza MD    12/12/2018

## 2018-12-12 NOTE — PATIENT INSTRUCTIONS
Her wounds appear to be healing well.  I reassured her that there did not seem to be abnormalities that need to be addressed.  Fullness and discomfort in the area will continue to improve over time.

## 2021-04-14 DIAGNOSIS — Z23 ENCOUNTER FOR IMMUNIZATION: ICD-10-CM

## 2022-04-22 NOTE — ANESTHESIA POSTPROCEDURE EVALUATION
PRE-SEDATION ASSESSMENT    CONSENT  Risks, benefits, and alternatives have been discussed with the patient/patient representative, and patient/patient representative agrees to proceed: Yes    MEDICAL HISTORY  Significant medical/surgical history: Yes (Metastatic Colon Ca liver)  Past Complications with Sedation/Anesthesia: No  Significant Family History: No  Smoking History: Yes (former)  Alcohol/Drug abuse: No  Possible Pregnancy (LMP): Not Applicable  Cardiac History: No  Respiratory History: No    PHYSICAL EXAM  History and Physical Reviewed: H&P completed today  Airway Risk History: No previous complications  Airway Anatomy : Class II  Heart : Normal  Lungs : Normal  LOC/Mental Status : Normal    OTHER FINDINGS  Reviewed current medications and allergies: Yes  Pertinent lab/diagnostic test reviewed: Yes    SEDATION RISK ASSESSMENT  Risk Status ASA: Class II - Normal patient with mild systemic disease  Plan for Sedation: Moderate Sedation  Indications for Procedure/Pre-Procedure Diagnosis and Planned Procedure: Pending CTX  EKG Monitoring: Yes    NARRATIVE FINDINGS  Narrative Findings: RIGHT Chest Port   Patient: Corina Galicia    Procedure Summary     Date:  09/20/18 Room / Location:  [facility] OR  / [facility] OR    Anesthesia Start:  1104 Anesthesia Stop:  1159    Procedure:  PILONIDAL CYSTECTOMY (N/A ) Diagnosis:       Sebaceous cyst      (CYST)    Surgeon:  Gino Oropeza MD Responsible Provider:  Susan Ortiz MD    Anesthesia Type:  general ASA Status:  1          Anesthesia Type: general  PACU Vitals  9/20/2018 1154 - 9/20/2018 1254      9/20/2018 1158 9/20/2018 1200 9/20/2018 1210 9/20/2018 1220    BP: - 114/70 101/76 111/72    Temp: 36.9 °C (98.4 °F) - - -    Pulse: - (!)  107 (!)  107 98    Resp: - (!)  26 19 (!)  21    SpO2: - 97 % 99 % 100 %              9/20/2018 1230 9/20/2018 1240 9/20/2018 1250       BP: 112/67 111/67 115/68     Temp: - - -     Pulse: 93 82 89     Resp: 17 16 17     SpO2: 100 % 100 % 100 %             Anesthesia Post Evaluation    Pain management: adequate  Patient participation: complete - patient participated  Level of consciousness: awake and alert  Cardiovascular status: acceptable  Airway Patency: adequate  Respiratory status: acceptable  Hydration status: acceptable  Anesthetic complications: no  Comments: Late entry

## 2024-10-09 NOTE — TELEPHONE ENCOUNTER
Left message for home care nurse [facility]. 10/15/18 @ 3:06pm   The patient presents for f/u appt.  she has had some changes in bowel habits with non-formed stools last few months.  denies abd pain or gi bleeding.  she feels sx's are due to her UC.  compliant with po mesalamine.  reports nausea sx's without vomiting or pain with po intake.  colonoscopy in April with signs of mild active UC in left side of colon.

## (undated) DEVICE — SYRINGE 20CC NO NEEDLE ST

## (undated) DEVICE — PAD GROUND ELECTROSURGICAL W/CORD

## (undated) DEVICE — OINTMENT SURGILUBE 4OZ TUBE

## (undated) DEVICE — PACK MINOR BMH

## (undated) DEVICE — SOLN IRRIG STER WATER 1000ML

## (undated) DEVICE — DRESSING SPONGE ALL GAUZE 4X4 STER 10PK

## (undated) DEVICE — ***USE 57698*** SLEEVE FLOWTRON DVT CALF SINGLE USE

## (undated) DEVICE — TUBE SUCTION 1/4INX20FT STERILE

## (undated) DEVICE — DRESSING TEGADERM 4X4 3/4

## (undated) DEVICE — DRESSING ADAPTIC STERILE 3X3

## (undated) DEVICE — TIP SUCTION YANKAUER

## (undated) DEVICE — TAPE MICROFOAM  3IN 4 RL-BX

## (undated) DEVICE — Device

## (undated) DEVICE — SOLN IRRIG .9%SOD 1000ML

## (undated) DEVICE — CANISTER SUCTION 3000CC BEMIS

## (undated) DEVICE — APPLICATOR CAVILON FILM LARGE

## (undated) DEVICE — COMPOUND BENZOIN TINCTURE

## (undated) DEVICE — NEEDLE DISP HYPO 25GX1-1/2IN

## (undated) DEVICE — COVER LIGHTHANDLE (STERILE SINGLE PA